# Patient Record
Sex: MALE | Race: WHITE
[De-identification: names, ages, dates, MRNs, and addresses within clinical notes are randomized per-mention and may not be internally consistent; named-entity substitution may affect disease eponyms.]

---

## 2018-02-08 ENCOUNTER — HOSPITAL ENCOUNTER (EMERGENCY)
Dept: HOSPITAL 84 - D.ER | Age: 23
Discharge: HOME | End: 2018-02-08
Payer: MEDICAID
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
## 2018-02-08 DIAGNOSIS — F17.200: ICD-10-CM

## 2018-02-08 DIAGNOSIS — K08.89: Primary | ICD-10-CM

## 2018-02-08 DIAGNOSIS — K02.9: ICD-10-CM

## 2018-02-08 DIAGNOSIS — K05.10: ICD-10-CM

## 2018-02-08 DIAGNOSIS — S02.5XXA: ICD-10-CM
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
## 2018-02-27 ENCOUNTER — HOSPITAL ENCOUNTER (EMERGENCY)
Dept: HOSPITAL 84 - D.ER | Age: 23
Discharge: HOME | End: 2018-02-27
Payer: MEDICAID

## 2018-02-27 VITALS — BODY MASS INDEX: 21 KG/M2
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
## 2018-02-27 DIAGNOSIS — S92.521A: ICD-10-CM
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
## 2018-02-27 DIAGNOSIS — F17.200: ICD-10-CM

## 2018-02-27 DIAGNOSIS — S91.104A: Primary | ICD-10-CM
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
## 2018-02-27 DIAGNOSIS — S92.531A: ICD-10-CM

## 2018-02-28 ENCOUNTER — HOSPITAL ENCOUNTER (EMERGENCY)
Dept: HOSPITAL 84 - D.ER | Age: 23
Discharge: HOME | End: 2018-02-28
Payer: MEDICAID

## 2018-02-28 VITALS — BODY MASS INDEX: 21 KG/M2
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
## 2018-02-28 DIAGNOSIS — S91.311D: Primary | ICD-10-CM

## 2018-02-28 DIAGNOSIS — L08.9: ICD-10-CM

## 2018-03-12 ENCOUNTER — HOSPITAL ENCOUNTER (EMERGENCY)
Dept: HOSPITAL 84 - D.ER | Age: 23
Discharge: HOME | End: 2018-03-12
Payer: MEDICAID

## 2018-03-12 VITALS — BODY MASS INDEX: 21 KG/M2
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
## 2018-03-12 DIAGNOSIS — M79.674: ICD-10-CM

## 2018-03-12 DIAGNOSIS — S99.921D: Primary | ICD-10-CM

## 2018-03-12 DIAGNOSIS — F17.200: ICD-10-CM

## 2018-04-16 ENCOUNTER — HOSPITAL ENCOUNTER (OUTPATIENT)
Dept: HOSPITAL 84 - D.CT | Age: 23
Discharge: HOME | End: 2018-04-16
Attending: ORTHOPAEDIC SURGERY
Payer: MEDICAID

## 2018-04-16 VITALS — BODY MASS INDEX: 21 KG/M2
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
## 2018-04-16 DIAGNOSIS — T14.8XXA: ICD-10-CM
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
## 2018-04-16 DIAGNOSIS — R22.41: Primary | ICD-10-CM

## 2018-04-16 DIAGNOSIS — S92.531G: ICD-10-CM
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
## 2018-04-27 ENCOUNTER — HOSPITAL ENCOUNTER (INPATIENT)
Dept: HOSPITAL 84 - D.ER | Age: 23
LOS: 1 days | Discharge: HOME | DRG: 556 | End: 2018-04-28
Attending: INTERNAL MEDICINE | Admitting: INTERNAL MEDICINE
Payer: MEDICAID

## 2018-04-27 VITALS
WEIGHT: 155 LBS | BODY MASS INDEX: 20.99 KG/M2 | BODY MASS INDEX: 20.99 KG/M2 | BODY MASS INDEX: 20.99 KG/M2 | HEIGHT: 72 IN | WEIGHT: 155 LBS | HEIGHT: 72 IN

## 2018-04-27 VITALS — SYSTOLIC BLOOD PRESSURE: 107 MMHG | DIASTOLIC BLOOD PRESSURE: 52 MMHG

## 2018-04-27 VITALS — SYSTOLIC BLOOD PRESSURE: 136 MMHG | DIASTOLIC BLOOD PRESSURE: 72 MMHG

## 2018-04-27 VITALS — SYSTOLIC BLOOD PRESSURE: 145 MMHG | DIASTOLIC BLOOD PRESSURE: 99 MMHG

## 2018-04-27 DIAGNOSIS — M79.671: Primary | ICD-10-CM

## 2018-04-27 LAB
ALBUMIN SERPL-MCNC: 4 G/DL (ref 3.4–5)
ALP SERPL-CCNC: 71 U/L (ref 46–116)
ALT SERPL-CCNC: 23 U/L (ref 10–68)
ANION GAP SERPL CALC-SCNC: 10.2 MMOL/L (ref 8–16)
BASOPHILS NFR BLD AUTO: 0.1 % (ref 0–2)
BILIRUB SERPL-MCNC: 0.6 MG/DL (ref 0.2–1.3)
BUN SERPL-MCNC: 14 MG/DL (ref 7–18)
CALCIUM SERPL-MCNC: 8.9 MG/DL (ref 8.5–10.1)
CHLORIDE SERPL-SCNC: 107 MMOL/L (ref 98–107)
CO2 SERPL-SCNC: 27.5 MMOL/L (ref 21–32)
CREAT SERPL-MCNC: 1 MG/DL (ref 0.6–1.3)
EOSINOPHIL NFR BLD: 0.6 % (ref 0–7)
ERYTHROCYTE [DISTWIDTH] IN BLOOD BY AUTOMATED COUNT: 13.5 % (ref 11.5–14.5)
GLOBULIN SER-MCNC: 2.9 G/L
GLUCOSE SERPL-MCNC: 99 MG/DL (ref 74–106)
HCT VFR BLD CALC: 44.4 % (ref 42–54)
HGB BLD-MCNC: 15.5 G/DL (ref 13.5–17.5)
IMM GRANULOCYTES NFR BLD: 0.3 % (ref 0–5)
LYMPHOCYTES NFR BLD AUTO: 22 % (ref 15–50)
MCH RBC QN AUTO: 30.5 PG (ref 26–34)
MCHC RBC AUTO-ENTMCNC: 34.9 G/DL (ref 31–37)
MCV RBC: 87.2 FL (ref 80–100)
MONOCYTES NFR BLD: 8.8 % (ref 2–11)
NEUTROPHILS NFR BLD AUTO: 68.2 % (ref 40–80)
OSMOLALITY SERPL CALC.SUM OF ELEC: 281 MOSM/KG (ref 275–300)
PLATELET # BLD: 220 10X3/UL (ref 130–400)
PMV BLD AUTO: 10.5 FL (ref 7.4–10.4)
POTASSIUM SERPL-SCNC: 3.7 MMOL/L (ref 3.5–5.1)
PROT SERPL-MCNC: 6.9 G/DL (ref 6.4–8.2)
RBC # BLD AUTO: 5.09 10X6/UL (ref 4.2–6.1)
SODIUM SERPL-SCNC: 141 MMOL/L (ref 136–145)
WBC # BLD AUTO: 7.1 10X3/UL (ref 4.8–10.8)

## 2018-04-28 VITALS — SYSTOLIC BLOOD PRESSURE: 134 MMHG | DIASTOLIC BLOOD PRESSURE: 89 MMHG

## 2018-04-28 VITALS — DIASTOLIC BLOOD PRESSURE: 92 MMHG | SYSTOLIC BLOOD PRESSURE: 149 MMHG

## 2018-04-28 VITALS — DIASTOLIC BLOOD PRESSURE: 62 MMHG | SYSTOLIC BLOOD PRESSURE: 131 MMHG

## 2018-05-06 ENCOUNTER — HOSPITAL ENCOUNTER (EMERGENCY)
Dept: HOSPITAL 84 - D.ER | Age: 23
Discharge: HOME | End: 2018-05-06
Payer: MEDICAID

## 2018-05-06 VITALS — BODY MASS INDEX: 21 KG/M2

## 2018-05-06 DIAGNOSIS — F17.200: ICD-10-CM

## 2018-05-06 DIAGNOSIS — H66.91: Primary | ICD-10-CM

## 2018-05-06 DIAGNOSIS — R50.9: ICD-10-CM

## 2018-05-06 DIAGNOSIS — R05: ICD-10-CM

## 2018-05-06 DIAGNOSIS — H72.91: Primary | ICD-10-CM

## 2018-06-18 ENCOUNTER — HOSPITAL ENCOUNTER (EMERGENCY)
Dept: HOSPITAL 84 - D.ER | Age: 23
Discharge: HOME | End: 2018-06-18
Payer: MEDICAID

## 2018-06-18 VITALS — SYSTOLIC BLOOD PRESSURE: 120 MMHG | DIASTOLIC BLOOD PRESSURE: 77 MMHG

## 2018-06-18 VITALS
HEIGHT: 72 IN | BODY MASS INDEX: 21.67 KG/M2 | WEIGHT: 160 LBS | BODY MASS INDEX: 21.67 KG/M2 | WEIGHT: 160 LBS | HEIGHT: 72 IN

## 2018-06-18 DIAGNOSIS — F17.200: ICD-10-CM

## 2018-06-18 DIAGNOSIS — Z00.00: Primary | ICD-10-CM

## 2018-06-18 DIAGNOSIS — F90.9: ICD-10-CM

## 2018-07-26 ENCOUNTER — HOSPITAL ENCOUNTER (EMERGENCY)
Dept: HOSPITAL 84 - D.ER | Age: 23
LOS: 1 days | Discharge: HOME | End: 2018-07-27
Payer: MEDICAID

## 2018-07-26 VITALS — WEIGHT: 145.3 LBS | HEIGHT: 72 IN | BODY MASS INDEX: 19.68 KG/M2

## 2018-07-26 DIAGNOSIS — F17.200: ICD-10-CM
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
## 2018-07-26 DIAGNOSIS — S09.90XA: Primary | ICD-10-CM
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
## 2018-07-26 DIAGNOSIS — S00.03XA: ICD-10-CM

## 2018-07-26 DIAGNOSIS — G40.909: ICD-10-CM

## 2018-07-26 DIAGNOSIS — S49.92XA: ICD-10-CM

## 2018-07-26 DIAGNOSIS — S69.91XA: ICD-10-CM

## 2018-07-27 VITALS — DIASTOLIC BLOOD PRESSURE: 91 MMHG | SYSTOLIC BLOOD PRESSURE: 140 MMHG

## 2018-07-27 LAB
ALBUMIN SERPL-MCNC: 3.9 G/DL (ref 3.4–5)
ALP SERPL-CCNC: 70 U/L (ref 46–116)
ALT SERPL-CCNC: 17 U/L (ref 10–68)
ANION GAP SERPL CALC-SCNC: 13.8 MMOL/L (ref 8–16)
BILIRUB SERPL-MCNC: 0.29 MG/DL (ref 0.2–1.3)
BUN SERPL-MCNC: 13 MG/DL (ref 7–18)
CALCIUM SERPL-MCNC: 8.6 MG/DL (ref 8.5–10.1)
CHLORIDE SERPL-SCNC: 105 MMOL/L (ref 98–107)
CK MB SERPL-MCNC: 2.8 U/L (ref 0–3.6)
CK SERPL-CCNC: 269 UL (ref 21–232)
CO2 SERPL-SCNC: 27 MMOL/L (ref 21–32)
CREAT SERPL-MCNC: 1 MG/DL (ref 0.6–1.3)
ERYTHROCYTE [DISTWIDTH] IN BLOOD BY AUTOMATED COUNT: 13.1 % (ref 11.5–14.5)
GLOBULIN SER-MCNC: 3.4 G/L
GLUCOSE SERPL-MCNC: 102 MG/DL (ref 74–106)
HCT VFR BLD CALC: 44.6 % (ref 42–54)
HGB BLD-MCNC: 15.4 G/DL (ref 13.5–17.5)
LYMPHOCYTES NFR BLD AUTO: 10.1 % (ref 15–50)
MCH RBC QN AUTO: 29.4 PG (ref 26–34)
MCHC RBC AUTO-ENTMCNC: 34.5 G/DL (ref 31–37)
MCV RBC: 85.1 FL (ref 80–100)
NEUTROPHILS NFR BLD AUTO: 85.6 % (ref 40–80)
OSMOLALITY SERPL CALC.SUM OF ELEC: 282 MOSM/KG (ref 275–300)
PLATELET # BLD: 216 10X3/UL (ref 130–400)
PMV BLD AUTO: 11.1 FL (ref 7.4–10.4)
POTASSIUM SERPL-SCNC: 3.8 MMOL/L (ref 3.5–5.1)
PROT SERPL-MCNC: 7.3 G/DL (ref 6.4–8.2)
RBC # BLD AUTO: 5.24 10X6/UL (ref 4.2–6.1)
SODIUM SERPL-SCNC: 142 MMOL/L (ref 136–145)
WBC # BLD AUTO: 12.3 10X3/UL (ref 4.8–10.8)

## 2018-08-23 ENCOUNTER — HOSPITAL ENCOUNTER (EMERGENCY)
Dept: HOSPITAL 84 - D.ER | Age: 23
Discharge: HOME | End: 2018-08-23
Payer: MEDICAID

## 2018-08-23 VITALS — DIASTOLIC BLOOD PRESSURE: 87 MMHG | SYSTOLIC BLOOD PRESSURE: 136 MMHG

## 2018-08-23 VITALS — WEIGHT: 140.29 LBS | BODY MASS INDEX: 19 KG/M2 | HEIGHT: 72 IN

## 2018-08-23 DIAGNOSIS — F17.200: ICD-10-CM
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
## 2018-08-23 DIAGNOSIS — S50.01XA: ICD-10-CM
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
## 2018-08-23 DIAGNOSIS — G40.909: ICD-10-CM

## 2018-08-23 DIAGNOSIS — S49.91XA: Primary | ICD-10-CM

## 2018-09-14 ENCOUNTER — HOSPITAL ENCOUNTER (EMERGENCY)
Dept: HOSPITAL 84 - D.ER | Age: 23
Discharge: HOME | End: 2018-09-14
Payer: MEDICAID

## 2018-09-14 VITALS — DIASTOLIC BLOOD PRESSURE: 89 MMHG | SYSTOLIC BLOOD PRESSURE: 141 MMHG

## 2018-09-14 VITALS — HEIGHT: 72 IN | BODY MASS INDEX: 20.36 KG/M2 | WEIGHT: 150.32 LBS

## 2018-09-14 DIAGNOSIS — R05: ICD-10-CM
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
## 2018-09-14 DIAGNOSIS — S02.5XXA: ICD-10-CM

## 2018-09-14 DIAGNOSIS — F17.200: ICD-10-CM

## 2018-09-14 DIAGNOSIS — R09.89: ICD-10-CM

## 2018-09-14 DIAGNOSIS — H72.91: ICD-10-CM
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
## 2018-09-14 DIAGNOSIS — J01.90: ICD-10-CM

## 2018-09-14 DIAGNOSIS — H66.91: Primary | ICD-10-CM
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
## 2018-09-14 DIAGNOSIS — K08.89: ICD-10-CM

## 2018-09-16 ENCOUNTER — HOSPITAL ENCOUNTER (EMERGENCY)
Dept: HOSPITAL 84 - D.ER | Age: 23
Discharge: HOME | End: 2018-09-16
Payer: MEDICAID

## 2018-09-16 VITALS — HEIGHT: 72 IN | WEIGHT: 150.32 LBS | BODY MASS INDEX: 20.36 KG/M2

## 2018-09-16 VITALS — SYSTOLIC BLOOD PRESSURE: 163 MMHG | DIASTOLIC BLOOD PRESSURE: 101 MMHG

## 2018-09-16 DIAGNOSIS — F17.200: ICD-10-CM

## 2018-09-16 DIAGNOSIS — K02.9: Primary | ICD-10-CM

## 2018-09-16 DIAGNOSIS — K08.89: ICD-10-CM

## 2018-09-16 DIAGNOSIS — G40.909: ICD-10-CM

## 2018-09-19 ENCOUNTER — HOSPITAL ENCOUNTER (EMERGENCY)
Dept: HOSPITAL 84 - D.ER | Age: 23
Discharge: HOME | End: 2018-09-19
Payer: MEDICAID

## 2018-09-19 VITALS — HEIGHT: 72 IN | WEIGHT: 150.32 LBS | BODY MASS INDEX: 20.36 KG/M2

## 2018-09-19 VITALS — DIASTOLIC BLOOD PRESSURE: 87 MMHG | SYSTOLIC BLOOD PRESSURE: 136 MMHG

## 2018-09-19 DIAGNOSIS — K08.89: ICD-10-CM

## 2018-09-19 DIAGNOSIS — H92.01: ICD-10-CM

## 2018-09-19 DIAGNOSIS — G40.909: ICD-10-CM

## 2018-09-19 DIAGNOSIS — F17.200: ICD-10-CM

## 2018-09-19 DIAGNOSIS — K02.9: Primary | ICD-10-CM

## 2021-04-10 NOTE — NUR
PATIENT TAKEN TO ICU ROOM 2304 AT 0925. STABLE, VITALS SIGNS WNL, GCS 15, NAD.
BELONGINGS TAKEN WITH PATIENT INCLUDING BLACK HOODIE, SWEAT PANTS, TENNIS
SHOES. NO WALLET OR PHONE.

## 2021-04-10 NOTE — NUR
RECEIVED BEDSIDE REPORT. ROUNDING COMPLETE. PATIENT IS ALERT AND ORIENTED,
RESTING COMFORTABLY IN BED. RESPIRATIONS ARE EVEN AND UNLABORED. NO S/S OF
DISTRESS. NO C/O PAIN. CALL LIGHT WITHIN REACH. WILL CPOC.

## 2021-04-10 NOTE — NUR
PATIENT GIVEN 2ND CUP OF WATER FOR PO CHALLENGE. TOLERATING WELL. NO AIRWAY
COMPLICATIONS OR TROUBLE BREATHING. GCS 15. REPORTS NUMBNESS TO TONGUE AND
MOUTH, AND PAIN TO THROAT AND STOMACH. ALSO STATES HE WAS VOMITING CHUNKY
EMESIS AT HOME. DENIES COFFEE GROUNDS OR BLOOD.

## 2021-04-10 NOTE — NUR
PATIENT APPEARS DROWSY, PERRLA 4MM, DENIES RECREATIONAL DRUG USE. STATES ONLY
HOME MEDICATION TO BE KEPPRA DUE TO HX OF SEIZURES. NAD. AIRWAY PATENT NO
DIFFICULTY BREATHING.

## 2021-04-10 NOTE — NUR
CALL TO POISON CONTROL ON PT. SHE STATES TO HAVE PT SIP ON WATER TO SEE HOW HE
TOLERATES IT , PROPABLE GI CONSULT FOR THIS PT. DUE TO CAUSTIC NATURE OF
SUBSTANCES HE MIGHT HAVE DRANK. PT HEAVING IN TRIAGE STATES HE VOMITED A LOT AT
HOME, THAT IS WHAT WOKE HIM UP.

## 2021-04-10 NOTE — NUR
REC'D PT TO ICU. ALL MONITORING EQUIPMENT ATTACHED AND ALARMS SET. VSS, NSR
PEAKED T WAVE NOTED ON ECG. DR CAAL HERE. PT GRANDMOTHER IN ROOM.

## 2021-04-10 NOTE — NUR
ASSUMED CARE OF PATIENT. ASSESSMENT PERFORMED AT THIS TIME. PATIENT REQUESTING
SOMETHING FOR ANXIETY.

## 2021-04-11 NOTE — NUR
CALL TO PROVIDER, MEDS OBTAINED FOR INSOMNIA. ADMINISTERED BENADRYL 25 MG IVP
PER ORDER. PATIENT TOLERATED WELL. PROVIDED EDUCATION ABOUT EGD TO PATIENT.
PATIENT COMFORTED BY INFORMATION PROVIDED. DENIES FURTHER NEEDS AT THIS TIME.
REMINIDED OF NPO STATUS, PATIENT VERBALIZES UNDERSTANDING. REMOVED LIQUIDS
FROM ROOM. RESTING WHEN EXITING. CPOC.

## 2021-04-12 NOTE — CN
PATIENT NAME:CHRISTIE STERLING                          MEDICAL RECORD: [id number]
: [birth year]                                              LOCATION:[id number]
ADMIT DATE: 04/10/21                                       ACCOUNT: [id number]
CONSULTING PHYSICIAN:    EMMANUEL CAVANAUGH MD             
                                               
REFERRING PHYSICIAN:     BIA CAAL MD                
 
 
DATE OF CONSULTATION:  2021
 
IDENTIFYING DATA:  The patient is 26 years old and he was admitted to the
hospital on a voluntary basis secondary to an accidental ingestion.
 
CHIEF COMPLAINT:  None.
 
HISTORY OF PRESENT ILLNESS:  The patient is somewhat sedated.  He just underwent
an EGD procedure and is sedated, but awake enough to interview reliably.  The
EGD showed no evidence of a caustic material being ingested and his lab does not
show any evidence of that either.  He is denying that he wants to harm himself. 
He insists that what happened was accidental.  His grandmother says she thinks
he wants to kill himself because he is about to go to [employer], but he says that
is not true.  He has no history of trying to kill himself and he does not think
that his methamphetamine or marijuana use is a problem.
 
IMPRESSION:
1.  Polysubstance abuse.
2.  Probable malingering.
 
PLAN:  At this time, the patient shows no evidence of direct or acute
dangerousness to himself or others.  He may be discharged whenever it is
medically appropriate to do so.  Followup is a little problematic.  I think that
he could benefit from outpatient psychiatric care and particularly substance
abuse treatment, but he is rejecting both at this time.
 
TRANSINT:[id number] Voice Confirmation ID: [id number] DOCUMENT ID: [id number]
                                           
                                           EMMANUEL CAVANAUGH MD             
 
 
 
Electronically Signed by EMMANUEL CAVANAUGH on 21 at 1329
 
 
 
 
 
 
 
 
 
CC:                                                             [id number]
DICTATION DATE: 21 1126     :     21 1601      DIS IN  
                                                                      21
[address] [place] [address]

## 2021-04-12 NOTE — OP
PATIENT NAME:  CHRISTIE STERLING                      MEDICAL RECORD: [id number]
:89                                             LOCATION:.[facility]    D.2304
                                                         ADMISSION DATE:04/10/21
SURGEON:  DREW LEON MD            
 
 
DATE OF OPERATION:  2021
 
PREOPERATIVE DIAGNOSIS:  Accidental caustic ingestion.
 
POSTOPERATIVE DIAGNOSES:
1. Probable no caustic ingestion occurred.
2. Moderate antral gastritis with an antral ulcer.
3. Moderate duodenitis with ulceration.
4. Normal appearing vocal cords, normal appearing oropharynx, normal appearing
hypopharynx, normal appearing esophagus.
 
PROCEDURE:  Esophagogastroduodenoscopy with antral biopsy.
 
SURGEON:  Drew Leon MD
 
ASSISTANT:  None.
 
BLOOD LOSS:  Minimal.
 
ANESTHESIA:  IV sedation.
 
COMPLICATIONS:  None.
 
DESCRIPTION OF PROCEDURE:  The patient was seen in his ICU bed.  IV sedation was
induced by the anesthesia staff.  A bite block was inserted.  A gastroscope was
inserted into the mouth.  It was advanced easily into the hypopharynx.  The
esophagus was easily intubated as were the stomach and duodenum.  Upon
withdrawal, retroflexed and angulus views were obtained.  Antral biopsies were
obtained to rule out H. pylori.  The endoscope was then withdrawn under direct
vision.
 
IMPRESSION:  I believe that the gastritis, gastric ulcer, duodenitis and
duodenal ulcer are due to increased sympathetic tone from methamphetamine use. 
I saw no diffuse inflammation, which I would expect with a caustic oral
ingestion.  
 
PLAN:  The patient will be dismissed home on proton pump inhibitor.  I will see
him in the office in 2 to 3 weeks to review the results of his biopsy.
 
TRANSINT:[id number] Voice Confirmation ID: [id number] DOCUMENT ID: [id number]
                                           
                                           DREW LEON MD            
 
 
 
Electronically Signed by DREW LEON on 21 at 1050
CC: BIA CAAL and RASHAD SALDANA MD                              [id number]
DICTATION DATE: 21 1445     :     21      DIS IN  
                                                                      21
[facility]                                          
[address] [place] [address]